# Patient Record
Sex: FEMALE | Race: WHITE
[De-identification: names, ages, dates, MRNs, and addresses within clinical notes are randomized per-mention and may not be internally consistent; named-entity substitution may affect disease eponyms.]

---

## 2019-06-11 ENCOUNTER — HOSPITAL ENCOUNTER (INPATIENT)
Dept: HOSPITAL 72 - EMR | Age: 84
LOS: 3 days | Discharge: HOME | DRG: 690 | End: 2019-06-14
Payer: MEDICARE

## 2019-06-11 VITALS — SYSTOLIC BLOOD PRESSURE: 150 MMHG | DIASTOLIC BLOOD PRESSURE: 52 MMHG

## 2019-06-11 VITALS — WEIGHT: 152.15 LBS | BODY MASS INDEX: 24.45 KG/M2 | HEIGHT: 66 IN

## 2019-06-11 VITALS — SYSTOLIC BLOOD PRESSURE: 140 MMHG | DIASTOLIC BLOOD PRESSURE: 97 MMHG

## 2019-06-11 DIAGNOSIS — E86.0: ICD-10-CM

## 2019-06-11 DIAGNOSIS — G30.9: ICD-10-CM

## 2019-06-11 DIAGNOSIS — Z22.322: ICD-10-CM

## 2019-06-11 DIAGNOSIS — F02.80: ICD-10-CM

## 2019-06-11 DIAGNOSIS — R45.1: ICD-10-CM

## 2019-06-11 DIAGNOSIS — N39.0: Primary | ICD-10-CM

## 2019-06-11 DIAGNOSIS — I10: ICD-10-CM

## 2019-06-11 LAB
ADD MANUAL DIFF: NO
ALBUMIN SERPL-MCNC: 4.1 G/DL (ref 3.4–5)
ALBUMIN/GLOB SERPL: 1.3 {RATIO} (ref 1–2.7)
ALP SERPL-CCNC: 78 U/L (ref 46–116)
ALT SERPL-CCNC: 26 U/L (ref 12–78)
ANION GAP SERPL CALC-SCNC: 7 MMOL/L (ref 5–15)
APPEARANCE UR: CLEAR
APTT PPP: YELLOW S
AST SERPL-CCNC: 42 U/L (ref 15–37)
BASOPHILS NFR BLD AUTO: 0.9 % (ref 0–2)
BILIRUB SERPL-MCNC: 0.6 MG/DL (ref 0.2–1)
BUN SERPL-MCNC: 30 MG/DL (ref 7–18)
CALCIUM SERPL-MCNC: 9.4 MG/DL (ref 8.5–10.1)
CHLORIDE SERPL-SCNC: 106 MMOL/L (ref 98–107)
CK MB SERPL-MCNC: 13 NG/ML (ref 0–3.6)
CK SERPL-CCNC: 950 U/L (ref 26–308)
CO2 SERPL-SCNC: 29 MMOL/L (ref 21–32)
CREAT SERPL-MCNC: 1.4 MG/DL (ref 0.55–1.3)
EOSINOPHIL NFR BLD AUTO: 1 % (ref 0–3)
ERYTHROCYTE [DISTWIDTH] IN BLOOD BY AUTOMATED COUNT: 11.5 % (ref 11.6–14.8)
GLOBULIN SER-MCNC: 3.2 G/DL
GLUCOSE UR STRIP-MCNC: NEGATIVE MG/DL
HCT VFR BLD CALC: 41.2 % (ref 37–47)
HGB BLD-MCNC: 13.9 G/DL (ref 12–16)
KETONES UR QL STRIP: NEGATIVE
LEUKOCYTE ESTERASE UR QL STRIP: (no result)
LYMPHOCYTES NFR BLD AUTO: 24.6 % (ref 20–45)
MCV RBC AUTO: 86 FL (ref 80–99)
MONOCYTES NFR BLD AUTO: 9.9 % (ref 1–10)
NEUTROPHILS NFR BLD AUTO: 63.6 % (ref 45–75)
NITRITE UR QL STRIP: POSITIVE
PH UR STRIP: 5 [PH] (ref 4.5–8)
PLATELET # BLD: 263 K/UL (ref 150–450)
POTASSIUM SERPL-SCNC: 3.7 MMOL/L (ref 3.5–5.1)
PROT UR QL STRIP: NEGATIVE
RBC # BLD AUTO: 4.81 M/UL (ref 4.2–5.4)
SODIUM SERPL-SCNC: 142 MMOL/L (ref 136–145)
SP GR UR STRIP: 1.02 (ref 1–1.03)
UROBILINOGEN UR-MCNC: NORMAL MG/DL (ref 0–1)
WBC # BLD AUTO: 9.4 K/UL (ref 4.8–10.8)

## 2019-06-11 PROCEDURE — 82550 ASSAY OF CK (CPK): CPT

## 2019-06-11 PROCEDURE — 87086 URINE CULTURE/COLONY COUNT: CPT

## 2019-06-11 PROCEDURE — 85025 COMPLETE CBC W/AUTO DIFF WBC: CPT

## 2019-06-11 PROCEDURE — 81003 URINALYSIS AUTO W/O SCOPE: CPT

## 2019-06-11 PROCEDURE — 84484 ASSAY OF TROPONIN QUANT: CPT

## 2019-06-11 PROCEDURE — 99285 EMERGENCY DEPT VISIT HI MDM: CPT

## 2019-06-11 PROCEDURE — 83690 ASSAY OF LIPASE: CPT

## 2019-06-11 PROCEDURE — 87040 BLOOD CULTURE FOR BACTERIA: CPT

## 2019-06-11 PROCEDURE — 87081 CULTURE SCREEN ONLY: CPT

## 2019-06-11 PROCEDURE — 80053 COMPREHEN METABOLIC PANEL: CPT

## 2019-06-11 PROCEDURE — 71045 X-RAY EXAM CHEST 1 VIEW: CPT

## 2019-06-11 PROCEDURE — 83880 ASSAY OF NATRIURETIC PEPTIDE: CPT

## 2019-06-11 PROCEDURE — 80048 BASIC METABOLIC PNL TOTAL CA: CPT

## 2019-06-11 PROCEDURE — 93005 ELECTROCARDIOGRAM TRACING: CPT

## 2019-06-11 PROCEDURE — 83605 ASSAY OF LACTIC ACID: CPT

## 2019-06-11 PROCEDURE — 96365 THER/PROPH/DIAG IV INF INIT: CPT

## 2019-06-11 PROCEDURE — 36415 COLL VENOUS BLD VENIPUNCTURE: CPT

## 2019-06-11 PROCEDURE — 82553 CREATINE MB FRACTION: CPT

## 2019-06-11 PROCEDURE — 87181 SC STD AGAR DILUTION PER AGT: CPT

## 2019-06-11 RX ADMIN — DONEPEZIL HYDROCHLORIDE SCH MG: 5 TABLET, FILM COATED ORAL at 23:45

## 2019-06-11 NOTE — EMERGENCY ROOM REPORT
History of Present Illness


General


Chief Complaint:  Generalized Weakness


Source:  Patient





Present Illness


HPI


Patient presents by paramedics escorted with son who reports the patient 

appears to have been weaker than usual over the past 1 to 2 days she is usually 

more


Energetic and has more energy to ambulate


Patient herself denies any headache denies any chest pain denies any vomiting 

or diarrhea denies any focal weakness denies any abdominal pain


Son denies any change in medication was recently





Denies any other obvious acute change in mental status or focality regarding 

the weakness


Allergies:  


Coded Allergies:  


     No Known Allergies (Unverified , 6/11/19)





Patient History


Past Medical History:  see triage record


Pertinent Family History:  none


Last Menstrual Period:  n/a


Reviewed Nursing Documentation:  PMH: Agreed; PSxH: Agreed





Nursing Documentation-PMH


Past Medical History:  No History, Except For


Hx Hypertension:  Yes





Review of Systems


All Other Systems:  negative except mentioned in HPI





Physical Exam





Vital Signs








  Date Time  Temp Pulse Resp B/P (MAP) Pulse Ox O2 Delivery O2 Flow Rate FiO2


 


6/11/19 19:31 98.8 71 18 132/62 (85) 92 Room Air  








Sp02 EP Interpretation:  reviewed, normal


General Appearance:  well appearing, no apparent distress


Head:  normocephalic, atraumatic


Eyes:  bilateral eye PERRL, bilateral eye EOMI


ENT:  hearing grossly normal, normal pharynx, TMs + canals normal, uvula midline


Neck:  full range of motion, supple, no meningismus, no bony tend


Respiratory:  lungs clear, normal breath sounds, no rhonchi, no respiratory 

distress, no retraction, no accessory muscle use


Cardiovascular #1:  normal peripheral pulses, regular rate, rhythm, no gallop, 

no JVD, no murmur


Gastrointestinal:  normal bowel sounds, non tender, soft, no mass, no 

organomegaly, non-distended, no guarding, no hernia, no pulsatile mass, no 

rebound


Genitourinary:  no CVA tenderness


Musculoskeletal:  normal inspection


Neurologic:  oriented x3, responsive, CNs III-XII nml as tested, motor strength/

tone normal, sensory intact


Psychiatric:  mood/affect normal


Skin:  palpation normal, other - Edema bilaterally


Lymphatic:  normal inspection, no adenopathy





Medical Decision Making


Diagnostic Impression:  


 Primary Impression:  


 UTI (urinary tract infection)


 Additional Impression:  


 Sepsis


ER Course


Multiple differentials and consideration patient has complex requiring 

extensive blood work and imaging urine sample does show evidence of


Infectious pathology





Patient requiring further hydration IV antibiotics and requires further 

inpatient care





Labs








Test


  6/11/19


19:45 6/11/19


20:15


 


White Blood Count


  9.4 K/UL


(4.8-10.8) 


 


 


Red Blood Count


  4.81 M/UL


(4.20-5.40) 


 


 


Hemoglobin


  13.9 G/DL


(12.0-16.0) 


 


 


Hematocrit


  41.2 %


(37.0-47.0) 


 


 


Mean Corpuscular Volume 86 FL (80-99)  


 


Mean Corpuscular Hemoglobin


  28.8 PG


(27.0-31.0) 


 


 


Mean Corpuscular Hemoglobin


Concent 33.6 G/DL


(32.0-36.0) 


 


 


Red Cell Distribution Width


  11.5 %


(11.6-14.8) 


 


 


Platelet Count


  263 K/UL


(150-450) 


 


 


Mean Platelet Volume


  5.6 FL


(6.5-10.1) 


 


 


Neutrophils (%) (Auto)


  63.6 %


(45.0-75.0) 


 


 


Lymphocytes (%) (Auto)


  24.6 %


(20.0-45.0) 


 


 


Monocytes (%) (Auto)


  9.9 %


(1.0-10.0) 


 


 


Eosinophils (%) (Auto)


  1.0 %


(0.0-3.0) 


 


 


Basophils (%) (Auto)


  0.9 %


(0.0-2.0) 


 


 


Sodium Level


  142 MMOL/L


(136-145) 


 


 


Potassium Level


  3.7 MMOL/L


(3.5-5.1) 


 


 


Chloride Level


  106 MMOL/L


() 


 


 


Carbon Dioxide Level


  29 MMOL/L


(21-32) 


 


 


Anion Gap


  7 mmol/L


(5-15) 


 


 


Blood Urea Nitrogen


  30 mg/dL


(7-18) 


 


 


Creatinine


  1.4 MG/DL


(0.55-1.30) 


 


 


Estimat Glomerular Filtration


Rate  mL/min (>60) 


  


 


 


Glucose Level


  128 MG/DL


() 


 


 


Lactic Acid Level


  1.20 mmol/L


(0.4-2.0) 


 


 


Calcium Level


  9.4 MG/DL


(8.5-10.1) 


 


 


Total Bilirubin


  0.6 MG/DL


(0.2-1.0) 


 


 


Aspartate Amino Transf


(AST/SGOT) 42 U/L (15-37) 


  


 


 


Alanine Aminotransferase


(ALT/SGPT) 26 U/L (12-78) 


  


 


 


Alkaline Phosphatase


  78 U/L


() 


 


 


Total Creatine Kinase


  950 U/L


() 


 


 


Creatine Kinase MB


  13.0 NG/ML


(0.0-3.6) 


 


 


Creatine Kinase MB Relative


Index 1.3 


  


 


 


Troponin I


  0.000 ng/mL


(0.000-0.056) 


 


 


Pro-B-Type Natriuretic Peptide


  257 pg/mL


(0-125) 


 


 


Total Protein


  7.3 G/DL


(6.4-8.2) 


 


 


Albumin


  4.1 G/DL


(3.4-5.0) 


 


 


Globulin 3.2 g/dL  


 


Albumin/Globulin Ratio 1.3 (1.0-2.7)  


 


Lipase


  134 U/L


() 


 


 


Urine Color  Yellow 


 


Urine Appearance  Clear 


 


Urine pH  5 (4.5-8.0) 


 


Urine Specific Gravity


  


  1.020


(1.005-1.035)


 


Urine Protein


  


  Negative


(NEGATIVE)


 


Urine Glucose (UA)


  


  Negative


(NEGATIVE)


 


Urine Ketones


  


  Negative


(NEGATIVE)


 


Urine Blood  2+ (NEGATIVE) 


 


Urine Nitrite


  


  Positive


(NEGATIVE)


 


Urine Bilirubin


  


  Negative


(NEGATIVE)


 


Urine Urobilinogen


  


  Normal MG/DL


(0.0-1.0)


 


Urine Leukocyte Esterase  1+ (NEGATIVE) 


 


Urine RBC


  


  0-2 /HPF (0 -


2)


 


Urine WBC


  


  2-4 /HPF (0 -


2)


 


Urine Squamous Epithelial


Cells 


  None /LPF


(NONE/OCC)


 


Urine Bacteria


  


  Moderate /HPF


(NONE)








Rhythm Strip Diag. Results


EP Interpretation:  yes


Rate:  77


Rhythm:  NSR, no PVC's, no ectopy





Chest X-Ray Diagnostic Results


Chest X-Ray Diagnostic Results :  


   Chest X-Ray Ordered:  Yes


   # of Views/Limited/Complete:  1 View


   Indication:  Chest Pain


   EP Interpretation:  Yes


   Interpretation:  no consolidation, no effusion, no pneumothorax


   Impression:  No acute disease


   Electronically Signed by:  Sarah Price DO





Last Vital Signs








  Date Time  Temp Pulse Resp B/P (MAP) Pulse Ox O2 Delivery O2 Flow Rate FiO2


 


6/11/19 19:31 98.8 71 18 132/62 (85) 92 Room Air  








Status:  improved


Disposition:  ADMITTED AS INPATIENT


Condition:  Serious


Scripts


Ciprofloxacin (CIPRO) 500 Mg/5 Ml Niki.[email or web address].rec


500 MG PO BID for 7 Days, #14 CAP


   Prov: Javi Raman MD         6/14/19 


Amlodipine Besylate (Norvasc) 2.5 Mg Tablet


2.5 MG ORAL DAILY, #30 TAB


   Prov: Javi Raman MD         6/14/19











Sarah Price DO Jun 11, 2019 20:09

## 2019-06-11 NOTE — NUR
NURSE NOTES:

RECEIVED REPORT FROM FREDRICK BARRERA. PT WAS TRANSFERRED TO THE UNIT BY [facility]. PT IS AWAKE, 
AAOX4. ON ROOM AIR, VSS. NO ACUTE DISTRESS NOTED. ORIENTED PT TO ROOM. IV ON LEFT AC 20G IS 
INTACT AND PATENT. RECEIVED ADMISSION ORDERS FROM DR. BOWIE. BED IS LOCKED AT THE 
LOWEST POSITION, BED ALARMS ACTIVE, SIDE RAILS UP X2 AND CALL LIGHT IS WITHIN REACH. WILL 
CONTINUE TO MONITOR.

## 2019-06-11 NOTE — NUR
ED Nurse Note:



RECIEVED PT BIBA FROM [facility] WITH C/O WEAKNESS X 2 DAYS, PT IS 
AWAKE, ALRT AND ORIENTED, PT DENIES PAIN, LYING QUIETLY IN BED, NO SOB OR 
LABORED BREATHING, PT GOWNED AND ASSISTED TO MONITORING, WILL RESUME CARE AS 
ORDERED AND CLOSELY MONITOR.

## 2019-06-11 NOTE — NUR
ED Nurse Note:



PT CONTINUES TO REST QUIETLY IN BED, AWAKE AND ALERT, COMPLETED IV ANTIBIOTICS, 
TOLERATED WELL, NO S/S OF ADVERSE REACTION NOTED, SITE INTACT, PT DENIES PAIN, 
PT IS BEING ADMITTED TO HOSPITAL, REPORT CALLED TO FREDRICK SHARMA ON UNIT, PT 
BELONGINGS WITH HER AND LIST COMPLETED, PT BEING TAKEN TO FLOOR UNIT VIA GURNEY 
AND ER-TECH, NAD NOTED DURING PT TRANSPORT.

## 2019-06-12 VITALS — DIASTOLIC BLOOD PRESSURE: 72 MMHG | SYSTOLIC BLOOD PRESSURE: 131 MMHG

## 2019-06-12 VITALS — SYSTOLIC BLOOD PRESSURE: 149 MMHG | DIASTOLIC BLOOD PRESSURE: 70 MMHG

## 2019-06-12 VITALS — SYSTOLIC BLOOD PRESSURE: 120 MMHG | DIASTOLIC BLOOD PRESSURE: 86 MMHG

## 2019-06-12 VITALS — DIASTOLIC BLOOD PRESSURE: 80 MMHG | SYSTOLIC BLOOD PRESSURE: 144 MMHG

## 2019-06-12 VITALS — SYSTOLIC BLOOD PRESSURE: 137 MMHG | DIASTOLIC BLOOD PRESSURE: 93 MMHG

## 2019-06-12 VITALS — DIASTOLIC BLOOD PRESSURE: 77 MMHG | SYSTOLIC BLOOD PRESSURE: 171 MMHG

## 2019-06-12 LAB
ADD MANUAL DIFF: NO
ANION GAP SERPL CALC-SCNC: 7 MMOL/L (ref 5–15)
BASOPHILS NFR BLD AUTO: 0.8 % (ref 0–2)
BUN SERPL-MCNC: 25 MG/DL (ref 7–18)
CALCIUM SERPL-MCNC: 8.8 MG/DL (ref 8.5–10.1)
CHLORIDE SERPL-SCNC: 113 MMOL/L (ref 98–107)
CO2 SERPL-SCNC: 26 MMOL/L (ref 21–32)
CREAT SERPL-MCNC: 1 MG/DL (ref 0.55–1.3)
EOSINOPHIL NFR BLD AUTO: 1.6 % (ref 0–3)
ERYTHROCYTE [DISTWIDTH] IN BLOOD BY AUTOMATED COUNT: 11.5 % (ref 11.6–14.8)
HCT VFR BLD CALC: 40.5 % (ref 37–47)
HGB BLD-MCNC: 13.6 G/DL (ref 12–16)
LYMPHOCYTES NFR BLD AUTO: 30.9 % (ref 20–45)
MCV RBC AUTO: 88 FL (ref 80–99)
MONOCYTES NFR BLD AUTO: 8.8 % (ref 1–10)
NEUTROPHILS NFR BLD AUTO: 58 % (ref 45–75)
PLATELET # BLD: 210 K/UL (ref 150–450)
POTASSIUM SERPL-SCNC: 3.7 MMOL/L (ref 3.5–5.1)
RBC # BLD AUTO: 4.6 M/UL (ref 4.2–5.4)
SODIUM SERPL-SCNC: 146 MMOL/L (ref 136–145)
WBC # BLD AUTO: 5.8 K/UL (ref 4.8–10.8)

## 2019-06-12 RX ADMIN — HEPARIN SODIUM SCH UNITS: 5000 INJECTION INTRAVENOUS; SUBCUTANEOUS at 20:59

## 2019-06-12 RX ADMIN — DONEPEZIL HYDROCHLORIDE SCH MG: 5 TABLET, FILM COATED ORAL at 20:59

## 2019-06-12 RX ADMIN — SODIUM CHLORIDE SCH MLS/HR: 0.9 INJECTION INTRAVENOUS at 20:58

## 2019-06-12 RX ADMIN — HEPARIN SODIUM SCH UNITS: 5000 INJECTION INTRAVENOUS; SUBCUTANEOUS at 09:14

## 2019-06-12 NOTE — NUR
NURSE NOTES:

Patient awake, confused, trying getting out of bed. Patient pulled the IV line and name tag 
out. bed at lowest position, side rails up x2, breaks engaged, bed alarm on, sign at the 
room marked fall precaution, yellow tag and gown changed, nursing assistant is ware that 
patient is fall percussion. will keep monitoring.

## 2019-06-12 NOTE — NUR
84 Y/O FEMALE BIBA FROM [facility]



CC:GENERALIZED WEAKNESS



SI:SEPSIS . DEHYDRATION . UTI

VS: /52, P 71, T 98.8, RR 18, SpO2 92

BUN 30, CR 1.4, AST 42



IS:NS 500ml IV

CEFTRIAXONE 55ml IVPB



**************ADMITTED TO MED/SURG***************



***DCP: RETURN TO [facility] [place]***

## 2019-06-12 NOTE — CARDIOLOGY REPORT
--------------- APPROVED REPORT --------------





EKG Measurement

Heart [id number]

AL [id number]

[id number]

[id number]

[id number]





Normal sinus rhythm with sinus arrhythmia

Possible Left atrial enlargement

Septal infarct, age undetermined

Abnormal ECG

## 2019-06-12 NOTE — GENERAL PROGRESS NOTE
Assessment/Plan


Status:  stable


Assessment/Plan:


1. UTI - cont rocephin 1 gm IV daily. follow urine and blood cx's.


2. Questionable Sepsis - follow up urine cx.


3. Dementia - cont Aricept 5 mg one po daily.


4. Dehydration - improved with IVF.





Subjective


Date patient seen:  Jun 12, 2019


Time patient seen:  08:40


Constitutional:  Reports: weakness


HEENT:  Reports: no symptoms


Cardiovascular:  Reports: no symptoms


Respiratory:  Reports: no symptoms


Gastrointestinal/Abdominal:  Reports: no symptoms


Genitourinary:  Reports: no symptoms


Neurologic/Psychiatric:  Reports: no symptoms


Endocrine:  Reports: no symptoms


Hematologic/Lymphatic:  Reports: no symptoms


Allergies:  


Coded Allergies:  


     No Known Allergies (Unverified , 6/11/19)


Subjective


This morning she feels better.  no sob or chest pain.


no fever or chills. No nausea or vomiting.





Objective





Last 24 Hour Vital Signs








  Date Time  Temp Pulse Resp B/P (MAP) Pulse Ox O2 Delivery O2 Flow Rate FiO2


 


6/12/19 04:00 98.4 71 17 120/86 (97) 96   


 


6/12/19 00:00 97.7 82 17 137/93 (108) 96   


 


6/11/19 23:10 97.8 87 18 140/97 (111) 96   


 


6/11/19 23:07      Room Air  


 


6/11/19 22:00 98.8 72 18 150/52 100 Room Air  


 


6/11/19 20:45 98.8 72 18 150/52 100 Room Air  


 


6/11/19 20:10  71 18   Room Air  


 


6/11/19 19:31 98.8 71 18 132/62 (85) 92 Room Air  

















Intake and Output  


 


 6/11/19 6/12/19





 19:00 07:00


 


Intake Total  240 ml


 


Output Total  400 ml


 


Balance  -160 ml


 


  


 


Intake Oral  240 ml


 


Output Urine Total  400 ml


 


# Voids  3


 


# Bowel Movements  2








Laboratory Tests


6/11/19 19:45: 


White Blood Count 9.4, Red Blood Count 4.81, Hemoglobin 13.9, Hematocrit 41.2, 

Mean Corpuscular Volume 86, Mean Corpuscular Hemoglobin 28.8, Mean Corpuscular 

Hemoglobin Concent 33.6, Red Cell Distribution Width 11.5L, Platelet Count 263, 

Mean Platelet Volume 5.6L, Neutrophils (%) (Auto) 63.6, Lymphocytes (%) (Auto) 

24.6, Monocytes (%) (Auto) 9.9, Eosinophils (%) (Auto) 1.0, Basophils (%) (Auto

) 0.9, Sodium Level 142, Potassium Level 3.7, Chloride Level 106, Carbon 

Dioxide Level 29, Anion Gap 7, Blood Urea Nitrogen 30H, Creatinine 1.4H, 

Estimat Glomerular Filtration Rate , Glucose Level 128H, Lactic Acid Level 1.20

, Calcium Level 9.4, Total Bilirubin 0.6, Aspartate Amino Transf (AST/SGOT) 42H

, Alanine Aminotransferase (ALT/SGPT) 26, Alkaline Phosphatase 78, Total 

Creatine Kinase 950H, Creatine Kinase MB 13.0H, Creatine Kinase MB Relative 

Index 1.3, Troponin I 0.000, Pro-B-Type Natriuretic Peptide 257H, Total Protein 

7.3, Albumin 4.1, Globulin 3.2, Albumin/Globulin Ratio 1.3, Lipase 134


6/11/19 20:15: 


Urine Color Yellow, Urine Appearance Clear, Urine pH 5, Urine Specific Gravity 

1.020, Urine Protein Negative, Urine Glucose (UA) Negative, Urine Ketones 

Negative, Urine Blood 2+H, Urine Nitrite PositiveH, Urine Bilirubin Negative, 

Urine Urobilinogen Normal, Urine Leukocyte Esterase 1+H, Urine RBC 0-2, Urine 

WBC 2-4, Urine Squamous Epithelial Cells None, Urine Bacteria ModerateH


6/12/19 05:50: 


White Blood Count 5.8, Red Blood Count 4.60, Hemoglobin 13.6, Hematocrit 40.5, 

Mean Corpuscular Volume 88, Mean Corpuscular Hemoglobin 29.5, Mean Corpuscular 

Hemoglobin Concent 33.6, Red Cell Distribution Width 11.5L, Platelet Count 210, 

Mean Platelet Volume 6.6, Neutrophils (%) (Auto) 58.0, Lymphocytes (%) (Auto) 

30.9, Monocytes (%) (Auto) 8.8, Eosinophils (%) (Auto) 1.6, Basophils (%) (Auto

) 0.8, Sodium Level 146H, Potassium Level 3.7, Chloride Level 113H, Carbon 

Dioxide Level 26, Anion Gap 7, Blood Urea Nitrogen 25H, Creatinine 1.0, Estimat 

Glomerular Filtration Rate , Glucose Level 99, Calcium Level 8.8


Height (Feet):  5


Height (Inches):  6.00


Weight (Pounds):  152


General Appearance:  no apparent distress, alert


EENT:  normal ENT inspection


Neck:  non-tender, normal alignment, supple


Cardiovascular:  normal peripheral pulses, normal rate, regular rhythm


Respiratory/Chest:  chest wall non-tender, lungs clear, normal breath sounds


Abdomen:  normal bowel sounds, non tender, soft


Extremities:  normal range of motion, non-tender


Edema:  no edema noted Arm (L), no edema noted Arm (R), no edema noted Leg (L), 

no edema noted Leg (R), no edema noted Pedal (L), no edema noted Pedal (R), no 

edema noted Generalized


Neurologic:  alert, oriented x 3, responsive


Skin:  warm/dry


Lymphatic:  normal anterior cervical (L), normal anterior cervical (R), normal 

posterior cervical (L), normal posterior cervical (R), normal submandibular (L)

, normal submandibular (R), normal supraclavicular (L), normal supraclavicular (

R), normal axillary (L), normal axillary (R), normal inguinal (L), normal 

inguinal (R), normal other











Javi Raman MD Jun 12, 2019 09:02

## 2019-06-12 NOTE — NUR
NURSE NOTES:

Received patient laying on the bed comfortably. Patient is awake, alert, but confused. 
Patient's bed is placed at lowest position, side rails up x2, breaks engaged, bed alarm on, 
sign at the room marked fall precaution, yellow tag and yellow gown on. Patient breathing 
unlabored and evenly. No discomfort, distress, or signs of pain noted at the time of 
handoff. Will continue to monitor.  

-------------------------------------------------------------------------------

Addendum: 06/12/19 at 1954 by Jessica Nieves RN

-------------------------------------------------------------------------------

NURSE NOTES:

Received patient laying on the bed comfortably. Patient is awake, alert, but confused. 
Patient's bed is placed at lowest position, side rails up x2, breaks engaged, bed alarm on, 
sign at the room marked fall precaution, yellow tag and yellow gown on. Patient breathing 
unlabored and evenly. No discomfort, distress, or signs of pain noted at the time of 
handoff. Will continue to monitor. Call light placed within reach for any assistance needed.

## 2019-06-12 NOTE — HISTORY AND PHYSICAL REPORT
DATE OF ADMISSION:  06/11/2019

CHIEF COMPLAINT:  Aggitation x 2-3 days



HISTORY OF PRESENT ILLNESS:  This is an 83-year-old female who came in for

a complaint of being weak for the past few days prior to admission.  The

patient lives in the assisted living [facility] and per discussion

with the nursing staff at the [facility], she has been slightly more

agitated in the past few days.  The patient was brought in for evaluation

in the emergency room.  She has history of dementia and is taking

Aricept 5 mg daily.



PAST MEDICAL HISTORY:  Includes history of Alzheimer dementia



PAST SURGICAL HISTORY:  None.



FAMILY HISTORY:  Noncontributory.



ALLERGIES:  No known drug allergies.



SOCIAL HISTORY:  The patient lives in [facility]note 3].  No

history of smoking.  No alcohol use.



REVIEW OF SYSTEMS:  Negative except for HPI.



PHYSICAL EXAMINATION:

VITAL SIGNS:  Temperature 98.8, pulse 71, respirations 18, blood pressure

132/62, pulse ox 92% on room air.

GENERAL APPEARANCE:  Alert, oriented x3, and in no acute distress.

HEENT:  Normocephalic, normochromic.  Extraocular muscles intact.  Throat

is clear.

NECK:  Supple.  No lymphadenopathy

CARDIOVASCULAR:  Regular rate and rhythm.  No murmur.  No gallop.

LUNGS:  Clear to auscultation bilaterally.

ABDOMEN:  Soft, nontender, and nondistended.  Positive bowel

sounds.

EXTREMITIES:  No edema, cyanosis, or clubbing.

GENITOURINARY:  No CVA tenderness.  No suprapubic tenderness.

NEUROLOGIC:  Alert, oriented x3.  Responsive.  Cranial nerves II to XII

intact.

SKIN:  No rash.



LABORATORY AND DIAGNOSTIC DATA:  Include a UA showing nitrite positive, +2

urine blood, +1 leukocyte esterase, moderate bacteria, and no epithelial

cells.  Sodium 142, potassium 3.7, chloride 106, carbon dioxide 29, BUN

30, creatinine 1.4, glucose 128.  AST 42, ALT 26.  Total creatine kinase

is 950.  Troponin 0, .  Albumin 4.1.  Lipase 134.  Lactic acid 1.2.

WBC 9.4, hemoglobin 13.9, hematocrit 41.2, platelet count 263,000.  EKG

showed normal sinus rhythm with slight sinus arrhythmia.



IMPRESSION:

1. Urinary tract infection.  We will start the patient on Rocephin and

follow urine cultures.

2. Agitation, probably secondary to urinary tract infection that has

resolved in the emergency room.

3. Dementia.  We will restart the patient on Aricept 5 mg daily.



The patient will be admitted for minimum of 2-night stay for IV

antibiotics for urinary tract infection.









  ______________________________________________

  Javi Raman M.D.





DR:  DONNIE

D:  06/12/2019 08:58

T:  06/12/2019 15:15

JOB#:  [id number]

CC:



MATTHEW

## 2019-06-12 NOTE — DIAGNOSTIC IMAGING REPORT
Indication: Chest pain

 

Technique: One view of the chest

 

Comparison: none

 

Findings: Lungs and pleural spaces are clear. Heart size is upper limits normal. The

aorta is tortuous and calcified

 

Impression: No acute process

## 2019-06-13 VITALS — SYSTOLIC BLOOD PRESSURE: 112 MMHG | DIASTOLIC BLOOD PRESSURE: 75 MMHG

## 2019-06-13 VITALS — SYSTOLIC BLOOD PRESSURE: 138 MMHG | DIASTOLIC BLOOD PRESSURE: 74 MMHG

## 2019-06-13 VITALS — SYSTOLIC BLOOD PRESSURE: 149 MMHG | DIASTOLIC BLOOD PRESSURE: 79 MMHG

## 2019-06-13 VITALS — DIASTOLIC BLOOD PRESSURE: 71 MMHG | SYSTOLIC BLOOD PRESSURE: 123 MMHG

## 2019-06-13 VITALS — DIASTOLIC BLOOD PRESSURE: 67 MMHG | SYSTOLIC BLOOD PRESSURE: 117 MMHG

## 2019-06-13 VITALS — DIASTOLIC BLOOD PRESSURE: 91 MMHG | SYSTOLIC BLOOD PRESSURE: 150 MMHG

## 2019-06-13 LAB
ADD MANUAL DIFF: NO
ANION GAP SERPL CALC-SCNC: 8 MMOL/L (ref 5–15)
BASOPHILS NFR BLD AUTO: 0.9 % (ref 0–2)
BUN SERPL-MCNC: 22 MG/DL (ref 7–18)
CALCIUM SERPL-MCNC: 8.5 MG/DL (ref 8.5–10.1)
CHLORIDE SERPL-SCNC: 110 MMOL/L (ref 98–107)
CO2 SERPL-SCNC: 26 MMOL/L (ref 21–32)
CREAT SERPL-MCNC: 1 MG/DL (ref 0.55–1.3)
EOSINOPHIL NFR BLD AUTO: 2.8 % (ref 0–3)
ERYTHROCYTE [DISTWIDTH] IN BLOOD BY AUTOMATED COUNT: 11.3 % (ref 11.6–14.8)
HCT VFR BLD CALC: 39.9 % (ref 37–47)
HGB BLD-MCNC: 13.4 G/DL (ref 12–16)
LYMPHOCYTES NFR BLD AUTO: 30.9 % (ref 20–45)
MCV RBC AUTO: 88 FL (ref 80–99)
MONOCYTES NFR BLD AUTO: 7.7 % (ref 1–10)
NEUTROPHILS NFR BLD AUTO: 57.7 % (ref 45–75)
PLATELET # BLD: 215 K/UL (ref 150–450)
POTASSIUM SERPL-SCNC: 3.6 MMOL/L (ref 3.5–5.1)
RBC # BLD AUTO: 4.54 M/UL (ref 4.2–5.4)
SODIUM SERPL-SCNC: 144 MMOL/L (ref 136–145)
WBC # BLD AUTO: 6.1 K/UL (ref 4.8–10.8)

## 2019-06-13 RX ADMIN — HEPARIN SODIUM SCH UNITS: 5000 INJECTION INTRAVENOUS; SUBCUTANEOUS at 20:31

## 2019-06-13 RX ADMIN — HEPARIN SODIUM SCH UNITS: 5000 INJECTION INTRAVENOUS; SUBCUTANEOUS at 08:50

## 2019-06-13 RX ADMIN — SODIUM CHLORIDE SCH MLS/HR: 0.9 INJECTION INTRAVENOUS at 20:15

## 2019-06-13 NOTE — GENERAL PROGRESS NOTE
Assessment/Plan


Status:  stable


Assessment/Plan:


1. UTI - cont rocephin 1 gm IV daily. follow urine and blood cx's.


2. Questionable Sepsis - follow up blood cx's.


3. Dementia - increase Aricept 10 mg one po daily.


4. HTN - start norvasc 2.5 mg one po daily.





Subjective


Date patient seen:  Jun 13, 2019


Time patient seen:  08:30


Constitutional:  Reports: no symptoms


HEENT:  Reports: no symptoms


Cardiovascular:  Reports: no symptoms


Respiratory:  Reports: no symptoms


Gastrointestinal/Abdominal:  Reports: no symptoms


Genitourinary:  Reports: no symptoms


Neurologic/Psychiatric:  Reports: no symptoms


Endocrine:  Reports: no symptoms


Hematologic/Lymphatic:  Reports: no symptoms


Allergies:  


Coded Allergies:  


     No Known Allergies (Unverified , 6/11/19)


Subjective


she is doing better.  no sob or chest pain.


no fever or chills. No nausea or vomiting.


patient is very calm and not aggitated but he blood pressure is trending high.


Her dementia is also has worsened in the past few months.





Objective





Last 24 Hour Vital Signs








  Date Time  Temp Pulse Resp B/P (MAP) Pulse Ox O2 Delivery O2 Flow Rate FiO2


 


6/13/19 08:00 97.7 72 20 150/91 (110) 100   


 


6/13/19 04:00 97.9 67 20 138/74 (95) 95   


 


6/13/19 00:00 97.7 77 20 149/79 (102) 94   


 


6/12/19 21:00      Room Air  


 


6/12/19 20:00 98.2 79 20 149/70 (96) 94   


 


6/12/19 16:00 98.2 67 20 144/80 (101) 97   


 


6/12/19 12:00 97.5 70 18 131/72 (91) 95   


 


6/12/19 09:00      Room Air  

















Intake and Output  


 


 6/12/19 6/13/19





 19:00 07:00


 


Intake Total 480 ml 55 ml


 


Output Total 600 ml 1 ml


 


Balance -120 ml 54 ml


 


  


 


Intake Oral 480 ml 


 


IV Total  55 ml


 


Output Urine Total 600 ml 


 


Stool Total  1 ml


 


# Voids 1 


 


# Bowel Movements  2








Laboratory Tests


6/13/19 05:40: 


White Blood Count 6.1, Red Blood Count 4.54, Hemoglobin 13.4, Hematocrit 39.9, 

Mean Corpuscular Volume 88, Mean Corpuscular Hemoglobin 29.5, Mean Corpuscular 

Hemoglobin Concent 33.5, Red Cell Distribution Width 11.3L, Platelet Count 215, 

Mean Platelet Volume 6.3L, Neutrophils (%) (Auto) 57.7, Lymphocytes (%) (Auto) 

30.9, Monocytes (%) (Auto) 7.7, Eosinophils (%) (Auto) 2.8, Basophils (%) (Auto

) 0.9, Sodium Level 144, Potassium Level 3.6, Chloride Level 110H, Carbon 

Dioxide Level 26, Anion Gap 8, Blood Urea Nitrogen 22H, Creatinine 1.0, Estimat 

Glomerular Filtration Rate , Glucose Level 95, Calcium Level 8.5


Height (Feet):  5


Height (Inches):  6.00


Weight (Pounds):  152


General Appearance:  no apparent distress, alert


EENT:  normal ENT inspection


Neck:  non-tender, normal alignment, supple


Cardiovascular:  normal peripheral pulses, normal rate, regular rhythm


Respiratory/Chest:  lungs clear, normal breath sounds


Abdomen:  normal bowel sounds, non tender, soft


Extremities:  normal range of motion, non-tender


Edema:  no edema noted Arm (L), no edema noted Arm (R), no edema noted Leg (L), 

no edema noted Leg (R), no edema noted Pedal (L), no edema noted Pedal (R), no 

edema noted Generalized


Neurologic:  alert, responsive


Skin:  warm/dry


Lymphatic:  normal anterior cervical (L), normal anterior cervical (R), normal 

posterior cervical (L), normal posterior cervical (R), normal submandibular (L)

, normal submandibular (R), normal supraclavicular (L), normal supraclavicular (

R), normal axillary (L), normal axillary (R), normal inguinal (L), normal 

inguinal (R), normal other











Javi Raman MD Jun 13, 2019 08:58

## 2019-06-13 NOTE — NUR
NURSE NOTES:

Received patient sitting on the bed. Patient is confused about where she is and likes to 
wander around the unit. Family member is aware of the patient's condition. Patient is 
breathing unlabored and evenly without signs of distress and SOB. New 22 G IV site is noted 
on the left hand, saline locked. IV dressing is intact, dry, and clean. Skin is warm to 
touch and dry. Patient prefers to put her own clothing on with shoes. Patient's bed placed 
at the lowest level with HOB slightly elevated, brakes on, and bed alarm on. Call light 
placed within reach. Will monitor frequently.

## 2019-06-13 NOTE — NUR
NURSE NOTES:

Patient is awake, eating breakfast; on room air, no sign of distress and shortness of 
breath; commode at the bed side; No IV access, will try to get an IV access on her; bed at 
lowest position, side rails up, breaks engaged, bed alarm on; will keep monitoring.

## 2019-06-14 VITALS — DIASTOLIC BLOOD PRESSURE: 80 MMHG | SYSTOLIC BLOOD PRESSURE: 149 MMHG

## 2019-06-14 VITALS — DIASTOLIC BLOOD PRESSURE: 76 MMHG | SYSTOLIC BLOOD PRESSURE: 122 MMHG

## 2019-06-14 VITALS — DIASTOLIC BLOOD PRESSURE: 79 MMHG | SYSTOLIC BLOOD PRESSURE: 150 MMHG

## 2019-06-14 VITALS — SYSTOLIC BLOOD PRESSURE: 136 MMHG | DIASTOLIC BLOOD PRESSURE: 84 MMHG

## 2019-06-14 VITALS — SYSTOLIC BLOOD PRESSURE: 137 MMHG | DIASTOLIC BLOOD PRESSURE: 77 MMHG

## 2019-06-14 VITALS — DIASTOLIC BLOOD PRESSURE: 69 MMHG | SYSTOLIC BLOOD PRESSURE: 134 MMHG

## 2019-06-14 LAB
ADD MANUAL DIFF: NO
ANION GAP SERPL CALC-SCNC: 8 MMOL/L (ref 5–15)
BASOPHILS NFR BLD AUTO: 0.9 % (ref 0–2)
BUN SERPL-MCNC: 23 MG/DL (ref 7–18)
CALCIUM SERPL-MCNC: 9.1 MG/DL (ref 8.5–10.1)
CHLORIDE SERPL-SCNC: 109 MMOL/L (ref 98–107)
CO2 SERPL-SCNC: 26 MMOL/L (ref 21–32)
CREAT SERPL-MCNC: 1.2 MG/DL (ref 0.55–1.3)
EOSINOPHIL NFR BLD AUTO: 3.3 % (ref 0–3)
ERYTHROCYTE [DISTWIDTH] IN BLOOD BY AUTOMATED COUNT: 11.4 % (ref 11.6–14.8)
HCT VFR BLD CALC: 39.9 % (ref 37–47)
HGB BLD-MCNC: 13.5 G/DL (ref 12–16)
LYMPHOCYTES NFR BLD AUTO: 35.2 % (ref 20–45)
MCV RBC AUTO: 88 FL (ref 80–99)
MONOCYTES NFR BLD AUTO: 8.7 % (ref 1–10)
NEUTROPHILS NFR BLD AUTO: 51.9 % (ref 45–75)
PLATELET # BLD: 206 K/UL (ref 150–450)
POTASSIUM SERPL-SCNC: 3.7 MMOL/L (ref 3.5–5.1)
RBC # BLD AUTO: 4.54 M/UL (ref 4.2–5.4)
SODIUM SERPL-SCNC: 143 MMOL/L (ref 136–145)
WBC # BLD AUTO: 5.8 K/UL (ref 4.8–10.8)

## 2019-06-14 RX ADMIN — HEPARIN SODIUM SCH UNITS: 5000 INJECTION INTRAVENOUS; SUBCUTANEOUS at 09:51

## 2019-06-14 RX ADMIN — SODIUM CHLORIDE SCH MLS/HR: 0.9 INJECTION INTRAVENOUS at 20:00

## 2019-06-14 NOTE — NUR
NURSE NOTES:



Patient is awake and alert,respirations unlabored.Patient sitting up and eating breakfast.No 
complaints at this time.Call light  within reach.

## 2019-06-14 NOTE — NUR
NURSE NOTES:

NURSE NOTES:

Patient removed IV site. Charge nurse made aware. Will endorse to next shift.

## 2019-06-14 NOTE — NUR
SI:HTN . UTI

VS: /79, P 55, T 97.2, RR 20, SpO2 96

CHLORIDE 109, BUN 23, GLUCOSE 108



IS:ARICEPT 10mg

NORVASC 2.5mg

CEFTRIAXONE 55ml IVPB

HEPARIN SUBQ



**************MED/SURG STATUS***************

## 2019-06-14 NOTE — NUR
NURSE NOTES:

Patient discharged to [facility] accompanied by 2 EMTs. Blanquita, son, aware 
and is expecting and waiting for patient at the facility. All belongings sent with patient. 
No complaints of pain, VSS. ID band removed, no IV site. Remained free from injury.

## 2019-06-14 NOTE — NUR
NURSE NOTES:

Comfort at  [facility] aware that  patient will be coming back to 
facility.Patient son Florin is also  aware and agree with discharge.Patient.Patient 
ambulating throughout the day,gait is steady.No complaints at  this time.

## 2019-06-14 NOTE — NUR
PT EVALUATION NOTE

Patient seen for initial evaluation. Patient demonstrates all functional mobility on 
independent/supervised level. Skilled inpatient PT intervention not warranted, patient 
discharged from PT. Dotty ALCALA notified 

of patient's status and that patient is cleared to ambulate in hallway with nursing 
supervision. 


-------------------------------------------------------------------------------

Addendum: 06/14/19 at 1106 by ABNER YOUNG PT

-------------------------------------------------------------------------------

Amended: Links added.

## 2019-06-14 NOTE — GENERAL PROGRESS NOTE
Assessment/Plan


Status:  stable


Assessment/Plan:


1. UTI - Will D/C home with cipro 500 mg one po bid x 7 days.  D/C back to 

Assisted living [facility] care.


2. Questionable Sepsis - Blood cx negative. 


3. Dementia - cont Aricept 10 mg one po daily.


4. HTN - controlled. cont norvasc 2.5 mg one po daily.


5. MRSA colonization of nares.





Subjective


Date patient seen:  Jun 14, 2019


Time patient seen:  16:00


Constitutional:  Reports: no symptoms


HEENT:  Reports: no symptoms


Cardiovascular:  Reports: no symptoms


Respiratory:  Reports: no symptoms


Gastrointestinal/Abdominal:  Reports: no symptoms


Genitourinary:  Reports: no symptoms


Neurologic/Psychiatric:  Reports: no symptoms


Endocrine:  Reports: no symptoms


Hematologic/Lymphatic:  Reports: no symptoms


Allergies:  


Coded Allergies:  


     No Known Allergies (Unverified , 6/11/19)


Subjective


she is doing better.  no sob or chest pain.


no fever or chills. No nausea or vomiting.





Objective





Last 24 Hour Vital Signs








  Date Time  Temp Pulse Resp B/P (MAP) Pulse Ox O2 Delivery O2 Flow Rate FiO2


 


6/14/19 12:00 97.6 63  137/77 (97) 96   


 


6/14/19 10:06      Room Air  


 


6/14/19 09:49  71  134/66    


 


6/14/19 08:00 97.2 68 17 136/84 (101) 96   


 


6/14/19 04:00 98.3 55 20 149/80 (103) 96   


 


6/14/19 00:00 98.0 79 20 150/79 (102) 97   


 


6/13/19 21:00      Room Air  


 


6/13/19 20:00 98.4 74 18 117/67 (84) 95   

















Intake and Output  


 


 6/13/19 6/14/19





 19:00 07:00


 


Intake Total  413 ml


 


Output Total  2 ml


 


Balance  411 ml


 


  


 


Intake Oral  358 ml


 


IV Total  55 ml


 


Output Urine Total  2 ml


 


Stool Total  0 ml


 


# Voids 4 








Laboratory Tests


6/14/19 05:30: 


White Blood Count 5.8, Red Blood Count 4.54, Hemoglobin 13.5, Hematocrit 39.9, 

Mean Corpuscular Volume 88, Mean Corpuscular Hemoglobin 29.8, Mean Corpuscular 

Hemoglobin Concent 34.0, Red Cell Distribution Width 11.4L, Platelet Count 206, 

Mean Platelet Volume 6.2L, Neutrophils (%) (Auto) 51.9, Lymphocytes (%) (Auto) 

35.2, Monocytes (%) (Auto) 8.7, Eosinophils (%) (Auto) 3.3H, Basophils (%) (Auto

) 0.9, Sodium Level 143, Potassium Level 3.7, Chloride Level 109H, Carbon 

Dioxide Level 26, Anion Gap 8, Blood Urea Nitrogen 23H, Creatinine 1.2, Estimat 

Glomerular Filtration Rate , Glucose Level 108H, Calcium Level 9.1


Height (Feet):  5


Height (Inches):  6.00


Weight (Pounds):  152


General Appearance:  no apparent distress, alert


EENT:  normal ENT inspection


Neck:  non-tender, normal alignment, supple


Cardiovascular:  normal peripheral pulses, normal rate, regular rhythm


Respiratory/Chest:  chest wall non-tender, lungs clear, normal breath sounds


Abdomen:  non tender, soft


Extremities:  normal range of motion, non-tender


Edema:  no edema noted Arm (L), no edema noted Arm (R), no edema noted Leg (L), 

no edema noted Leg (R), no edema noted Pedal (L), no edema noted Pedal (R), no 

edema noted Generalized


Neurologic:  alert, oriented x 3, responsive


Skin:  warm/dry


Lymphatic:  normal anterior cervical (L), normal anterior cervical (R), normal 

posterior cervical (L), normal posterior cervical (R), normal submandibular (L)

, normal submandibular (R), normal supraclavicular (L), normal supraclavicular (

R), normal axillary (L), normal axillary (R), normal inguinal (L), normal 

inguinal (R), normal other











Javi Raman MD Jun 14, 2019 16:13

## 2019-06-15 NOTE — DISCHARGE SUMMARY
DATE OF ADMISSION:  06/11/2019



DATE OF DISCHARGE:  06/14/2019



HOSPITAL COURSE:  This is an 83-year-old female, who came for a complaint

of weakness for few days prior to admission.  The patient in the emergency

room was found to have a urinary tract infection and was started on

intravenous antibiotic with Rocephin 1 gram daily.  The patient responded

to treatment and culture result showed the patient is sensitive to Cipro,

oral medication.  The patient is going to be discharged on the Cipro 500

b.i.d. for 7 more days for treatment of the urinary tract infection.  She

also is noticed to have a high blood pressure in the hospital and was

started on Norvasc 2.5 mg daily to control the blood pressure.  The

patient also has a history of dementia and because of progression of her

dementia, we will increase the Aricept to 10 mg daily and have the patient

follow up as outpatient.  She also had methicillin-resistant

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



DISCHARGE DIAGNOSES:

1. Urinary tract infection.

2. Dementia.

3. Hypertension.

4. Colonization of MRSA of the nares.



DISCHARGE MEDICATIONS:

1. Amlodipine 2.5 mg p.o. daily.

2. Cipro 500 mg p.o. b.i.d. for seven days.

3. Aricept 10 mg one p.o. daily.

4. Acetaminophen 500 q.6 hours p.r.n.



DISPOSITION:  The patient will be discharged back to the _____ [facility]

and I will follow the patient in a few days following the discharge.









  ______________________________________________

  Javi Raman M.D.





DR:  [place]

D:  06/14/2019 16:18

T:  06/15/2019 03:29

JOB#:  [id number]

CC:

## 2022-04-28 NOTE — NUR
HAND-OFF: 

Report given to Elodia ALCALA. No new care gaps identified.  Powered by [employer]. Reference number: [id number].   4/27/2022 9:37:25 PM CDT